# Patient Record
Sex: FEMALE | Race: AMERICAN INDIAN OR ALASKA NATIVE
[De-identification: names, ages, dates, MRNs, and addresses within clinical notes are randomized per-mention and may not be internally consistent; named-entity substitution may affect disease eponyms.]

---

## 2018-08-27 ENCOUNTER — HOSPITAL ENCOUNTER (EMERGENCY)
Dept: HOSPITAL 5 - ED | Age: 33
LOS: 1 days | Discharge: LEFT BEFORE DISCHARGE | End: 2018-08-28
Payer: COMMERCIAL

## 2018-08-27 DIAGNOSIS — R51: ICD-10-CM

## 2018-08-27 DIAGNOSIS — R50.9: Primary | ICD-10-CM

## 2018-08-27 DIAGNOSIS — M54.2: ICD-10-CM

## 2018-08-27 DIAGNOSIS — F17.200: ICD-10-CM

## 2018-08-27 LAB
ALBUMIN SERPL-MCNC: 4.2 G/DL (ref 3.9–5)
ALT SERPL-CCNC: 40 UNITS/L (ref 7–56)
BASOPHILS # (AUTO): 0.1 K/MM3 (ref 0–0.1)
BASOPHILS NFR BLD AUTO: 1 % (ref 0–1.8)
BILIRUB UR QL STRIP: (no result)
BLOOD UR QL VISUAL: (no result)
BUN SERPL-MCNC: 7 MG/DL (ref 7–17)
BUN/CREAT SERPL: 10 %
CALCIUM SERPL-MCNC: 9.2 MG/DL (ref 8.4–10.2)
EOSINOPHIL # BLD AUTO: 0.4 K/MM3 (ref 0–0.4)
EOSINOPHIL NFR BLD AUTO: 4.1 % (ref 0–4.3)
HCT VFR BLD CALC: 40.5 % (ref 30.3–42.9)
HEMOLYSIS INDEX: 14
HGB BLD-MCNC: 13.4 GM/DL (ref 10.1–14.3)
INR PPP: 0.93 (ref 0.87–1.13)
LYMPHOCYTES # BLD AUTO: 1.7 K/MM3 (ref 1.2–5.4)
LYMPHOCYTES NFR BLD AUTO: 17.6 % (ref 13.4–35)
MCH RBC QN AUTO: 32 PG (ref 28–32)
MCHC RBC AUTO-ENTMCNC: 33 % (ref 30–34)
MCV RBC AUTO: 96 FL (ref 79–97)
MONOCYTES # (AUTO): 0.8 K/MM3 (ref 0–0.8)
MONOCYTES % (AUTO): 8.1 % (ref 0–7.3)
MUCOUS THREADS #/AREA URNS HPF: (no result) /HPF
PH UR STRIP: 5 [PH] (ref 5–7)
PLATELET # BLD: 190 K/MM3 (ref 140–440)
RBC # BLD AUTO: 4.23 M/MM3 (ref 3.65–5.03)
RBC #/AREA URNS HPF: 3 /HPF (ref 0–6)
UROBILINOGEN UR-MCNC: 4 MG/DL (ref ?–2)
WBC #/AREA URNS HPF: 3 /HPF (ref 0–6)

## 2018-08-27 PROCEDURE — 82140 ASSAY OF AMMONIA: CPT

## 2018-08-27 PROCEDURE — 80053 COMPREHEN METABOLIC PANEL: CPT

## 2018-08-27 PROCEDURE — 81001 URINALYSIS AUTO W/SCOPE: CPT

## 2018-08-27 PROCEDURE — 93010 ELECTROCARDIOGRAM REPORT: CPT

## 2018-08-27 PROCEDURE — 71046 X-RAY EXAM CHEST 2 VIEWS: CPT

## 2018-08-27 PROCEDURE — 85610 PROTHROMBIN TIME: CPT

## 2018-08-27 PROCEDURE — 87040 BLOOD CULTURE FOR BACTERIA: CPT

## 2018-08-27 PROCEDURE — 96365 THER/PROPH/DIAG IV INF INIT: CPT

## 2018-08-27 PROCEDURE — 82805 BLOOD GASES W/O2 SATURATION: CPT

## 2018-08-27 PROCEDURE — 93005 ELECTROCARDIOGRAM TRACING: CPT

## 2018-08-27 PROCEDURE — 85025 COMPLETE CBC W/AUTO DIFF WBC: CPT

## 2018-08-27 PROCEDURE — 99285 EMERGENCY DEPT VISIT HI MDM: CPT

## 2018-08-27 PROCEDURE — 36415 COLL VENOUS BLD VENIPUNCTURE: CPT

## 2018-08-27 PROCEDURE — 81025 URINE PREGNANCY TEST: CPT

## 2018-08-27 PROCEDURE — 70450 CT HEAD/BRAIN W/O DYE: CPT

## 2018-08-27 PROCEDURE — 87086 URINE CULTURE/COLONY COUNT: CPT

## 2018-08-27 NOTE — EMERGENCY DEPARTMENT REPORT
ED Fever HPI





- General


Chief Complaint: Fever


Stated Complaint: DISCOMFORT ON (R) SHOULDER


Time Seen by Provider: 08/27/18 20:15


Source: patient


Exam Limitations: no limitations





- History of Present Illness


Initial Comments: 





32-year-old female with no significant past medical history presents to the 

hospital with complaints of neck pain for the past 2 days and headache that 

started today.  Patient complains of pain to the base of the neck and medial 

trapezius areas rated 10/10 and intensity and worse movement of headache and 

any direction.  Patient is a frontal throbbing headache that started today.  

She did not realize she had a fever until it was checked upon arrival.  She 

denies, chest pain, shortness of breath, blurred vision, focal weakness, focal 

numbness, nausea, vomiting, diarrhea, or dysuria.





ED Review of Systems


ROS: 


Stated complaint: DISCOMFORT ON (R) SHOULDER


Other details as noted in HPI





Comment: All other systems reviewed and negative





ED Past Medical Hx





- Past Medical History


Previous Medical History?: No





- Surgical History


Past Surgical History?: No





- Social History


Smoking Status: Current Every Day Smoker


Substance Use Type: Alcohol





- Medications


Home Medications: 


 Home Medications











 Medication  Instructions  Recorded  Confirmed  Last Taken  Type


 


Ibuprofen [Motrin] 800 mg PO Q8HR PRN #30 tablet 08/27/18  Unknown Rx


 


cefUROXime [Ceftin] 500 mg PO Q12H #20 tablet 08/27/18  Unknown Rx














ED Physical Exam





- General


Limitations: No Limitations





- Other


Other exam information: 





General: No limitations, patient is alert in no acute distress


Head exam: Atraumatic, normocephalic


Eyes exam: Normal appearance, pupils equal reactive to light, extraocular 

movements intact


ENT: Moist mucous membrane, normal oropharynx


Neck exam: Normal inspection, full range of motion, no midline tenderness and 

no tenderness to paracervical muscles.  Patient has tenderness to medial 

trapezius muscles on palpation which is exacerbated by head movement in any 

direction


Respiratory exam: Clear to auscultation bilateral, no wheezes, rales, crackles


Cardiovascular: Normal rate and rhythm, normal heart sounds


Abdomen: Soft, nondistended, and  nontender, with normal bowel sounds, no 

rebound, or guarding


Extremity: Full range of motion normal inspection no deformity


Back: Normal Inspection, full range of motion, no tenderness


Neurologic: Alert, oriented x3, cranial nerves intact, no motor or sensory 

deficit


Psychiatric: normal affect, normal mood


Skin: Warm, dry, intact





ED Course


 Vital Signs











  08/27/18 08/27/18 08/27/18





  18:16 18:26 19:26


 


Temperature 102.3 F H  


 


Pulse Rate 108 H  


 


Respiratory 18 18 18





Rate   


 


Blood Pressure 126/85  


 


Blood Pressure   





[Right]   


 


O2 Sat by Pulse 97  





Oximetry   














  08/27/18 08/27/18 08/27/18





  19:37 19:39 21:00


 


Temperature  100.5 F H 


 


Pulse Rate  93 H 91 H


 


Respiratory 18 18 14





Rate   


 


Blood Pressure   132/82


 


Blood Pressure  117/77 





[Right]   


 


O2 Sat by Pulse 99 99 





Oximetry   














  08/27/18 08/27/18 08/27/18





  21:03 21:20 21:30


 


Temperature   


 


Pulse Rate  93 H 87


 


Respiratory 18 20 17





Rate   


 


Blood Pressure   


 


Blood Pressure   





[Right]   


 


O2 Sat by Pulse  99 97





Oximetry   














  08/27/18 08/27/18 08/27/18





  21:33 21:46 22:00


 


Temperature   


 


Pulse Rate  84 75


 


Respiratory 18 21 19





Rate   


 


Blood Pressure   


 


Blood Pressure   





[Right]   


 


O2 Sat by Pulse  98 99





Oximetry   














  08/27/18 08/27/18 08/27/18





  22:16 22:30 22:46


 


Temperature   


 


Pulse Rate 93 H 82 77


 


Respiratory 20 23 17





Rate   


 


Blood Pressure   


 


Blood Pressure   





[Right]   


 


O2 Sat by Pulse 98 98 98





Oximetry   














  08/27/18 08/27/18





  23:00 23:28


 


Temperature  98.4 F


 


Pulse Rate 74 79


 


Respiratory 23 15





Rate  


 


Blood Pressure  


 


Blood Pressure  111/71





[Right]  


 


O2 Sat by Pulse 96 98





Oximetry  














- Reevaluation(s)


Reevaluation #1: 





08/27/18 22:48


after toradol pt is able to move her neck better and appears nontoxic








ED Medical Decision Making





- Lab Data


Result diagrams: 


 08/27/18 18:26





 08/27/18 18:26








 Lab Results











  08/27/18 08/27/18 08/27/18 Range/Units





  18:26 18:26 18:26 


 


WBC  9.7    (4.5-11.0)  K/mm3


 


RBC  4.23    (3.65-5.03)  M/mm3


 


Hgb  13.4    (10.1-14.3)  gm/dl


 


Hct  40.5    (30.3-42.9)  %


 


MCV  96    (79-97)  fl


 


MCH  32    (28-32)  pg


 


MCHC  33    (30-34)  %


 


RDW  13.9    (13.2-15.2)  %


 


Plt Count  190    (140-440)  K/mm3


 


Lymph % (Auto)  17.6    (13.4-35.0)  %


 


Mono % (Auto)  8.1 H    (0.0-7.3)  %


 


Eos % (Auto)  4.1    (0.0-4.3)  %


 


Baso % (Auto)  1.0    (0.0-1.8)  %


 


Lymph #  1.7    (1.2-5.4)  K/mm3


 


Mono #  0.8    (0.0-0.8)  K/mm3


 


Eos #  0.4    (0.0-0.4)  K/mm3


 


Baso #  0.1    (0.0-0.1)  K/mm3


 


Seg Neutrophils %  69.2    (40.0-70.0)  %


 


Seg Neutrophils #  6.7    (1.8-7.7)  K/mm3


 


PT   13.0   (12.2-14.9)  Sec.


 


INR   0.93   (0.87-1.13)  


 


VBG pH     (7.320-7.420)  


 


Sodium    136 L  (137-145)  mmol/L


 


Potassium    3.9  (3.6-5.0)  mmol/L


 


Chloride    101.2  ()  mmol/L


 


Carbon Dioxide    21 L  (22-30)  mmol/L


 


Anion Gap    18  mmol/L


 


BUN    7  (7-17)  mg/dL


 


Creatinine    0.7  (0.7-1.2)  mg/dL


 


Estimated GFR    > 60  ml/min


 


BUN/Creatinine Ratio    10  %


 


Glucose    88  ()  mg/dL


 


Lactic Acid     (0.7-2.0)  mmol/L


 


Calcium    9.2  (8.4-10.2)  mg/dL


 


Total Bilirubin    0.40  (0.1-1.2)  mg/dL


 


AST    37  (5-40)  units/L


 


ALT    40  (7-56)  units/L


 


Alkaline Phosphatase    66  ()  units/L


 


Total Protein    7.7  (6.3-8.2)  g/dL


 


Albumin    4.2  (3.9-5)  g/dL


 


Albumin/Globulin Ratio    1.2  %


 


Urine Color     (Yellow)  


 


Urine Turbidity     (Clear)  


 


Urine pH     (5.0-7.0)  


 


Ur Specific Gravity     (1.003-1.030)  


 


Urine Protein     (Negative)  mg/dL


 


Urine Glucose (UA)     (Negative)  mg/dL


 


Urine Ketones     (Negative)  mg/dL


 


Urine Blood     (Negative)  


 


Urine Nitrite     (Negative)  


 


Urine Bilirubin     (Negative)  


 


Urine Urobilinogen     (<2.0)  mg/dL


 


Ur Leukocyte Esterase     (Negative)  


 


Urine WBC (Auto)     (0.0-6.0)  /HPF


 


Urine RBC (Auto)     (0.0-6.0)  /HPF


 


U Epithel Cells (Auto)     (0-13.0)  /HPF


 


Urine Mucus     /HPF


 


Urine HCG, Qual     (Negative)  














  08/27/18 08/27/18 08/27/18 Range/Units





  18:26 18:26 20:30 


 


WBC     (4.5-11.0)  K/mm3


 


RBC     (3.65-5.03)  M/mm3


 


Hgb     (10.1-14.3)  gm/dl


 


Hct     (30.3-42.9)  %


 


MCV     (79-97)  fl


 


MCH     (28-32)  pg


 


MCHC     (30-34)  %


 


RDW     (13.2-15.2)  %


 


Plt Count     (140-440)  K/mm3


 


Lymph % (Auto)     (13.4-35.0)  %


 


Mono % (Auto)     (0.0-7.3)  %


 


Eos % (Auto)     (0.0-4.3)  %


 


Baso % (Auto)     (0.0-1.8)  %


 


Lymph #     (1.2-5.4)  K/mm3


 


Mono #     (0.0-0.8)  K/mm3


 


Eos #     (0.0-0.4)  K/mm3


 


Baso #     (0.0-0.1)  K/mm3


 


Seg Neutrophils %     (40.0-70.0)  %


 


Seg Neutrophils #     (1.8-7.7)  K/mm3


 


PT     (12.2-14.9)  Sec.


 


INR     (0.87-1.13)  


 


VBG pH   7.400   (7.320-7.420)  


 


Sodium     (137-145)  mmol/L


 


Potassium     (3.6-5.0)  mmol/L


 


Chloride     ()  mmol/L


 


Carbon Dioxide     (22-30)  mmol/L


 


Anion Gap     mmol/L


 


BUN     (7-17)  mg/dL


 


Creatinine     (0.7-1.2)  mg/dL


 


Estimated GFR     ml/min


 


BUN/Creatinine Ratio     %


 


Glucose     ()  mg/dL


 


Lactic Acid  0.90    (0.7-2.0)  mmol/L


 


Calcium     (8.4-10.2)  mg/dL


 


Total Bilirubin     (0.1-1.2)  mg/dL


 


AST     (5-40)  units/L


 


ALT     (7-56)  units/L


 


Alkaline Phosphatase     ()  units/L


 


Total Protein     (6.3-8.2)  g/dL


 


Albumin     (3.9-5)  g/dL


 


Albumin/Globulin Ratio     %


 


Urine Color    Mable  (Yellow)  


 


Urine Turbidity    Slightly-cloudy  (Clear)  


 


Urine pH    5.0  (5.0-7.0)  


 


Ur Specific Gravity    1.028  (1.003-1.030)  


 


Urine Protein    30 mg/dl  (Negative)  mg/dL


 


Urine Glucose (UA)    Neg  (Negative)  mg/dL


 


Urine Ketones    Tr  (Negative)  mg/dL


 


Urine Blood    Mod  (Negative)  


 


Urine Nitrite    Neg  (Negative)  


 


Urine Bilirubin    Neg  (Negative)  


 


Urine Urobilinogen    4.0  (<2.0)  mg/dL


 


Ur Leukocyte Esterase    Neg  (Negative)  


 


Urine WBC (Auto)    3.0  (0.0-6.0)  /HPF


 


Urine RBC (Auto)    3.0  (0.0-6.0)  /HPF


 


U Epithel Cells (Auto)    15.0 H  (0-13.0)  /HPF


 


Urine Mucus    3+  /HPF


 


Urine HCG, Qual    Negative  (Negative)  














  08/27/18 Range/Units





  21:30 


 


WBC   (4.5-11.0)  K/mm3


 


RBC   (3.65-5.03)  M/mm3


 


Hgb   (10.1-14.3)  gm/dl


 


Hct   (30.3-42.9)  %


 


MCV   (79-97)  fl


 


MCH   (28-32)  pg


 


MCHC   (30-34)  %


 


RDW   (13.2-15.2)  %


 


Plt Count   (140-440)  K/mm3


 


Lymph % (Auto)   (13.4-35.0)  %


 


Mono % (Auto)   (0.0-7.3)  %


 


Eos % (Auto)   (0.0-4.3)  %


 


Baso % (Auto)   (0.0-1.8)  %


 


Lymph #   (1.2-5.4)  K/mm3


 


Mono #   (0.0-0.8)  K/mm3


 


Eos #   (0.0-0.4)  K/mm3


 


Baso #   (0.0-0.1)  K/mm3


 


Seg Neutrophils %   (40.0-70.0)  %


 


Seg Neutrophils #   (1.8-7.7)  K/mm3


 


PT   (12.2-14.9)  Sec.


 


INR   (0.87-1.13)  


 


VBG pH   (7.320-7.420)  


 


Sodium   (137-145)  mmol/L


 


Potassium   (3.6-5.0)  mmol/L


 


Chloride   ()  mmol/L


 


Carbon Dioxide   (22-30)  mmol/L


 


Anion Gap   mmol/L


 


BUN   (7-17)  mg/dL


 


Creatinine   (0.7-1.2)  mg/dL


 


Estimated GFR   ml/min


 


BUN/Creatinine Ratio   %


 


Glucose   ()  mg/dL


 


Lactic Acid  0.90  (0.7-2.0)  mmol/L


 


Calcium   (8.4-10.2)  mg/dL


 


Total Bilirubin   (0.1-1.2)  mg/dL


 


AST   (5-40)  units/L


 


ALT   (7-56)  units/L


 


Alkaline Phosphatase   ()  units/L


 


Total Protein   (6.3-8.2)  g/dL


 


Albumin   (3.9-5)  g/dL


 


Albumin/Globulin Ratio   %


 


Urine Color   (Yellow)  


 


Urine Turbidity   (Clear)  


 


Urine pH   (5.0-7.0)  


 


Ur Specific Gravity   (1.003-1.030)  


 


Urine Protein   (Negative)  mg/dL


 


Urine Glucose (UA)   (Negative)  mg/dL


 


Urine Ketones   (Negative)  mg/dL


 


Urine Blood   (Negative)  


 


Urine Nitrite   (Negative)  


 


Urine Bilirubin   (Negative)  


 


Urine Urobilinogen   (<2.0)  mg/dL


 


Ur Leukocyte Esterase   (Negative)  


 


Urine WBC (Auto)   (0.0-6.0)  /HPF


 


Urine RBC (Auto)   (0.0-6.0)  /HPF


 


U Epithel Cells (Auto)   (0-13.0)  /HPF


 


Urine Mucus   /HPF


 


Urine HCG, Qual   (Negative)  














- Radiology Data


Radiology results: report reviewed





FINAL REPORT EXAM: CT HEAD/BRAIN WO CON HISTORY: ha, fever, neck stiffness 

TECHNIQUE: Noncontrast CT axial images of the brain. PRIORS: None. FINDINGS: No 

parenchymal mass, mass effect, hemorrhage, midline shift or hydrocephalus. No 

evidence of acute cortical infarct. No abnormal, extra-axial fluid or air 

collection. Osseous calvarium grossly intact. Mild mucosal thickening scattered 

in the ethmoid sinuses. IMPRESSION: 1. No acute intracranial findings. 





FINAL REPORT PROCEDURE: XR CHEST ROUTINE 2V TECHNIQUE: PA and lateral chest 

radiographs were obtained. HISTORY: Possible sepsis. COMPARISON: No prior 

studies are available for comparison. FINDINGS: Heart: Normal. Mediastinum/

Vessels: Normal. Lungs/Pleural space: Normal. Bony thorax: No acute osseous 

abnormality. Other: IMPRESSION: No radiographic evidence of acute 

cardiopulmonary disease. 





- Medical Decision Making





Headache, neck pain, and fever


No other source of fever identified if patient does not have urinary symptoms


Informed patient of risk of meningitis however, since patient is nontoxic 

appearing viral is more likely in the differential as opposed to bacterial


Informed patient that the only way to diagnose this is by lumbar puncture


Patient refused lumbar puncture


After Toradol IV patient is able to move her neck in all directions and states 

that pain is minimal continues to look nontoxic


2 g IV Rocephin ordered empirically patient will be discharged on antibiotics 

after signing any AMA for refusal for LP to rule out meningitis


Discharge instructions to return if symptoms worsen





- Differential Diagnosis


viral syndrome, pharyngitis, meningitis, muscle strain


Critical Care Time: No


Critical care attestation.: 


If time is entered above; I have spent that time in minutes in the direct care 

of this critically ill patient, excluding procedure time.








ED Disposition


Clinical Impression: 


 Fever, Neck pain





Disposition: DC-07 LEFT AGAINST MED ADVICE


Is pt being admited?: No


Does the pt Need Aspirin: No


Condition: Stable


Instructions:  Fever in Adults (ED), Cervical Sprain (ED), Viral Meningitis (ED)


Additional Instructions: 


I am unable to rule out meningitis since you have declined lumbar puncture at 

this time.  You have been given discharge instructions for fever, neck sprain, 

as well as viral meningitis. You received IV antibiotics in the ED and were 

empirically placed on antibiotics to cover for infection.  Please note that 

bacterial meningitis cannot be ruled out without the lumbar puncture and this 

is a life-threatening condition.  Please return if symptoms worsen despite 

treatment.  Otherwise, follow-up with the  doctor or clinic provided.





You have been provided information from [employer] regarding the cost of the 

antibiotic. You May also download this bladimir on your phone


Prescriptions: 


cefUROXime [Ceftin] 500 mg PO Q12H #20 tablet


Ibuprofen [Motrin] 800 mg PO Q8HR PRN #30 tablet


 PRN Reason: Pain, Moderate (4-6)


Referrals: 


PRIMARY CARE,MD [Primary Care Provider] - 24 Hours


[facility] [Provider Group] - 24 Hours


HUNTER FRANCO MD [Staff Physician] - 24 Hours


Forms:  AMA Form


Time of Disposition: 23:58

## 2018-08-27 NOTE — XRAY REPORT
FINAL REPORT



PROCEDURE:  XR CHEST ROUTINE 2V



TECHNIQUE:  PA and lateral chest radiographs were obtained.



HISTORY:  Possible sepsis. 



COMPARISON:  No prior studies are available for comparison.



FINDINGS:  

Heart: Normal.



Mediastinum/Vessels: Normal.



Lungs/Pleural space: Normal.



Bony thorax: No acute osseous abnormality.



Other: 



IMPRESSION:  

No radiographic evidence of acute cardiopulmonary disease.

## 2018-08-27 NOTE — CAT SCAN REPORT
FINAL REPORT



EXAM:  CT HEAD/BRAIN WO CON



HISTORY:  ha, fever, neck stiffness 



TECHNIQUE:  Noncontrast CT axial images of the brain. 



PRIORS:  None.



FINDINGS:  

No parenchymal mass, mass effect, hemorrhage, midline shift or

hydrocephalus. No evidence of acute cortical infarct. No

abnormal, extra-axial fluid or air collection. 



Osseous calvarium grossly intact. Mild mucosal thickening

scattered in the ethmoid sinuses. 



IMPRESSION:  

1. No acute intracranial findings.

## 2018-08-28 VITALS — SYSTOLIC BLOOD PRESSURE: 102 MMHG | DIASTOLIC BLOOD PRESSURE: 50 MMHG
